# Patient Record
Sex: MALE | Race: BLACK OR AFRICAN AMERICAN | NOT HISPANIC OR LATINO | Employment: OTHER | URBAN - NONMETROPOLITAN AREA
[De-identification: names, ages, dates, MRNs, and addresses within clinical notes are randomized per-mention and may not be internally consistent; named-entity substitution may affect disease eponyms.]

---

## 2022-05-12 ENCOUNTER — HOSPITAL ENCOUNTER (EMERGENCY)
Facility: HOSPITAL | Age: 77
Discharge: HOME | End: 2022-05-12
Payer: MEDICARE

## 2022-05-12 VITALS
HEIGHT: 74 IN | RESPIRATION RATE: 16 BRPM | TEMPERATURE: 98 F | OXYGEN SATURATION: 96 % | WEIGHT: 300 LBS | DIASTOLIC BLOOD PRESSURE: 83 MMHG | HEART RATE: 65 BPM | SYSTOLIC BLOOD PRESSURE: 144 MMHG | BODY MASS INDEX: 38.5 KG/M2

## 2022-05-12 DIAGNOSIS — M25.512 LEFT SHOULDER PAIN, UNSPECIFIED CHRONICITY: Primary | ICD-10-CM

## 2022-05-12 PROCEDURE — 99283 EMERGENCY DEPT VISIT LOW MDM: CPT

## 2022-05-12 PROCEDURE — 99282 EMERGENCY DEPT VISIT SF MDM: CPT | Mod: 25 | Performed by: EMERGENCY MEDICINE

## 2022-05-12 NOTE — ED TRIAGE NOTES
Pt presents to ED with L shoulder pain x 2 hours. Pt states that he was doing PT arm exercises and shoulder popped out of place.

## 2022-05-12 NOTE — ED PROVIDER NOTES
Encounter Date: 5/12/2022       History     Chief Complaint   Patient presents with    Shoulder Pain     C/o Left shoulder pain while Abducting LUE this AM. He has pain with movement of LUE.         Review of patient's allergies indicates:  No Known Allergies  Past Medical History:   Diagnosis Date    Hypertension      History reviewed. No pertinent surgical history.  History reviewed. No pertinent family history.  Social History     Tobacco Use    Smoking status: Never Smoker    Smokeless tobacco: Never Used   Substance Use Topics    Alcohol use: Never    Drug use: Never     Review of Systems   All other systems reviewed and are negative.      Physical Exam     Initial Vitals [05/12/22 1417]   BP Pulse Resp Temp SpO2   (!) 144/83 65 16 98.2 °F (36.8 °C) 96 %      MAP       --         Physical Exam    Constitutional: He appears well-developed and well-nourished.   HENT:   Head: Normocephalic.   Eyes: Pupils are equal, round, and reactive to light.   Neck:   Normal range of motion.  Abdominal:   obese   Musculoskeletal:      Cervical back: Normal range of motion.      Comments: Tender left external shoulder. No step down. M/S intact           Medical Screening Exam   See Full Note    ED Course   Procedures  Labs Reviewed - No data to display       Imaging Results          X-Ray Shoulder 2 or More Views Left (Final result)  Result time 05/12/22 14:45:31    Final result by Maximo Lin II, MD (05/12/22 14:45:31)                 Impression:      Shoulder osteoarthrosis as described above.      Electronically signed by: Maximo Lin  Date:    05/12/2022  Time:    14:45             Narrative:    EXAMINATION:  XR SHOULDER COMPLETE 2 OR MORE VIEWS LEFT    CLINICAL HISTORY:  describes left shoulder pain while abducting BUE this AM;    COMPARISON:  None available    FINDINGS:  No evidence of fracture seen.  The alignment of the joints appears normal.  Moderate acromioclavicular joint and mild-to-moderate  glenohumeral joint degenerative change is present.  No soft tissue abnormality is seen.                              X-Rays:   Independently Interpreted Readings:   Other Readings:  Negative    Medications - No data to display                    Clinical Impression:   Final diagnoses:  [M25.512] Left shoulder pain, unspecified chronicity (Primary)          ED Disposition Condition    Discharge Stable        ED Prescriptions     None        Follow-up Information    None          Real Jefferson MD  05/12/22 1547

## 2022-05-15 ENCOUNTER — TELEPHONE (OUTPATIENT)
Dept: EMERGENCY MEDICINE | Facility: HOSPITAL | Age: 77
End: 2022-05-15
Payer: MEDICARE

## 2022-05-16 ENCOUNTER — TELEPHONE (OUTPATIENT)
Dept: EMERGENCY MEDICINE | Facility: HOSPITAL | Age: 77
End: 2022-05-16
Payer: MEDICARE

## 2024-07-02 DIAGNOSIS — M17.9 OSTEOARTHRITIS OF KNEE, UNSPECIFIED: Primary | ICD-10-CM

## 2024-09-04 DIAGNOSIS — M25.562 LEFT KNEE PAIN, UNSPECIFIED CHRONICITY: Primary | ICD-10-CM

## 2024-09-05 ENCOUNTER — OFFICE VISIT (OUTPATIENT)
Dept: ORTHOPEDICS | Facility: CLINIC | Age: 79
End: 2024-09-05
Payer: MEDICARE

## 2024-09-05 ENCOUNTER — HOSPITAL ENCOUNTER (OUTPATIENT)
Dept: RADIOLOGY | Facility: HOSPITAL | Age: 79
Discharge: HOME | End: 2024-09-05
Payer: MEDICARE

## 2024-09-05 VITALS — HEIGHT: 75 IN | WEIGHT: 300 LBS | BODY MASS INDEX: 37.3 KG/M2

## 2024-09-05 DIAGNOSIS — M17.9 OSTEOARTHRITIS OF KNEE, UNSPECIFIED: ICD-10-CM

## 2024-09-05 DIAGNOSIS — M17.12 PRIMARY OSTEOARTHRITIS OF LEFT KNEE: Primary | ICD-10-CM

## 2024-09-05 DIAGNOSIS — M25.562 LEFT KNEE PAIN, UNSPECIFIED CHRONICITY: ICD-10-CM

## 2024-09-05 PROCEDURE — 99999 PR PBB SHADOW E&M-EST. PATIENT-LVL III: CPT | Mod: PBBFAC,,,

## 2024-09-05 PROCEDURE — 1159F MED LIST DOCD IN RCRD: CPT | Mod: CPTII,,,

## 2024-09-05 PROCEDURE — 99213 OFFICE O/P EST LOW 20 MIN: CPT | Mod: PBBFAC,25

## 2024-09-05 PROCEDURE — 99203 OFFICE O/P NEW LOW 30 MIN: CPT | Mod: S$PBB,,,

## 2024-09-05 PROCEDURE — 73564 X-RAY EXAM KNEE 4 OR MORE: CPT | Mod: 26,LT,, | Performed by: RADIOLOGY

## 2024-09-05 PROCEDURE — 73564 X-RAY EXAM KNEE 4 OR MORE: CPT | Mod: TC,LT

## 2024-09-05 RX ORDER — EZETIMIBE 10 MG/1
10 TABLET ORAL DAILY
COMMUNITY
Start: 2024-04-19

## 2024-09-05 RX ORDER — ALLOPURINOL 100 MG/1
200 TABLET ORAL DAILY
COMMUNITY
Start: 2024-04-19

## 2024-09-05 RX ORDER — ASPIRIN 81 MG/1
81 TABLET ORAL DAILY
COMMUNITY
Start: 2024-04-19

## 2024-09-05 RX ORDER — RISPERIDONE 37.5MG/2ML
37.5 KIT INTRAMUSCULAR
COMMUNITY
Start: 2024-04-05

## 2024-09-05 RX ORDER — CARVEDILOL 25 MG/1
37.5 TABLET ORAL
COMMUNITY
Start: 2024-04-19

## 2024-09-05 RX ORDER — FUROSEMIDE 40 MG/1
40 TABLET ORAL DAILY
COMMUNITY
Start: 2024-04-19

## 2024-09-05 NOTE — PROGRESS NOTES
CC:   Chief Complaint   Patient presents with    Left Knee - Pain          Jc Navas is a 78 y.o. 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  He has previously had steroid injections of the left knee greater than 10 years ago.  He reports he is currently taking Voltaren gel for left knee pain without much relief.  He reports a dull aching pain with weight-bearing.  He denies any recent fall or injury.  No other complaints today.        PAST MEDICAL HISTORY:   Past Medical History:   Diagnosis Date    Hypertension           PAST SURGICAL HISTORY: History reviewed. No pertinent surgical history.       ALLERGIES: Review of patient's allergies indicates:  No Known Allergies     MEDICATIONS :    Current Outpatient Medications:     allopurinoL (ZYLOPRIM) 100 MG tablet, Take 200 mg by mouth once daily., Disp: , Rfl:     aspirin (ECOTRIN) 81 MG EC tablet, Take 81 mg by mouth once daily., Disp: , Rfl:     carvediloL (COREG) 25 MG tablet, Take 37.5 mg by mouth., Disp: , Rfl:     ezetimibe (ZETIA) 10 mg tablet, Take 10 mg by mouth once daily., Disp: , Rfl:     furosemide (LASIX) 40 MG tablet, Take 40 mg by mouth once daily., Disp: , Rfl:     risperiDONE microspheres (RISPERDAL CONSTA) 37.5 mg/2 mL injection, Inject 37.5 mg into the muscle every 14 (fourteen) days., Disp: , Rfl:     sacubitriL-valsartan (ENTRESTO) 49-51 mg per tablet, Take 1 tablet by mouth 2 (two) times daily., Disp: , Rfl:      SOCIAL HISTORY:   Social History     Socioeconomic History    Marital status:    Tobacco Use    Smoking status: Never    Smokeless tobacco: Never   Substance and Sexual Activity    Alcohol use: Never    Drug use: Never    Sexual activity: Not Currently        FAMILY HISTORY: No family history on file.       PHYSICAL EXAM:      There were no vitals filed for this visit.  Body mass index is 37.5 kg/m².    GENERAL:  Well-developed, well-nourished male . The patient is alert, oriented and cooperative.    HEENT:  Normocephalic, atraumatic.  Extraocular movements are intact bilaterally.     NECK:  Nontender with good range of motion.    LUNGS:  Clear to auscultation bilaterally.    HEART:  Regular rate and rhythm.     ABDOMEN:  Soft, non-tender, non-distended.      EXTREMITIES:  Left knee with skin clean dry and intact, tenderness to palpation along medial and lateral joint lines, crepitus noted over patella with movement, range of motion from 0-100 degrees but not without pain, knee feels stable to varus and valgus stress testing, neurovascularly intact      RADIOGRAPHIC FINDINGS:   No results found.     There are no problems to display for this patient.    IMPRESSION AND PLAN:  Primary osteoarthritis left knee.  Personally reviewed x-rays today showing moderate to severe tricompartmental degenerative changes with osteophyte formation and joint space loss, no acute fracture or dislocation.  Discussed all treatment options with the patient today.  Discussed that he would likely need a total knee arthroplasty in the near future.  Patient reports that he would not like to pursue surgical options at this time.  Discussed injection options to include steroid or viscous gel injections.  Patient reports minimal relief from previous injections greater than 10 years ago.  He would like to try the gel injections.  Prior authorization order placed today.  For Synvisc-One.  Follow up with me in 3 weeks for injection.  Otherwise p.r.n..    No follow-ups on file.       Aaliyah Johnson PA-C      (Subject to voice recognition error, transcription service not allowed)

## 2024-12-18 DIAGNOSIS — M25.562 LEFT KNEE PAIN: Primary | ICD-10-CM

## 2025-01-16 ENCOUNTER — OFFICE VISIT (OUTPATIENT)
Dept: ORTHOPEDICS | Facility: CLINIC | Age: 80
End: 2025-01-16
Payer: MEDICARE

## 2025-01-16 DIAGNOSIS — M25.562 LEFT KNEE PAIN: ICD-10-CM

## 2025-01-16 PROCEDURE — 99999 PR PBB SHADOW E&M-EST. PATIENT-LVL II: CPT | Mod: PBBFAC,,,

## 2025-01-16 PROCEDURE — 99213 OFFICE O/P EST LOW 20 MIN: CPT | Mod: S$PBB,,,

## 2025-01-16 PROCEDURE — 99212 OFFICE O/P EST SF 10 MIN: CPT | Mod: PBBFAC

## 2025-02-05 NOTE — PROGRESS NOTES
CC: No chief complaint on file.       Jc Navas is a 79 y.o. male seen today for follow up of No chief complaint on file.  Patient presents today for left knee pain.  He is hoping for gel injections however he is a no-show for his appointment in September for gel injections.  Discussed that new order would have to be placed today. Reports continued left knee pain. No new fall/injury.       PAST MEDICAL HISTORY:   Past Medical History:   Diagnosis Date    Hypertension         PAST SURGICAL HISTORY: No past surgical history on file.     ALLERGIES: Review of patient's allergies indicates:  No Known Allergies     MEDICATIONS :    Current Outpatient Medications:     allopurinoL (ZYLOPRIM) 100 MG tablet, Take 200 mg by mouth once daily., Disp: , Rfl:     aspirin (ECOTRIN) 81 MG EC tablet, Take 81 mg by mouth once daily., Disp: , Rfl:     carvediloL (COREG) 25 MG tablet, Take 37.5 mg by mouth., Disp: , Rfl:     ezetimibe (ZETIA) 10 mg tablet, Take 10 mg by mouth once daily., Disp: , Rfl:     furosemide (LASIX) 40 MG tablet, Take 40 mg by mouth once daily., Disp: , Rfl:     risperiDONE microspheres (RISPERDAL CONSTA) 37.5 mg/2 mL injection, Inject 37.5 mg into the muscle every 14 (fourteen) days., Disp: , Rfl:     sacubitriL-valsartan (ENTRESTO) 49-51 mg per tablet, Take 1 tablet by mouth 2 (two) times daily., Disp: , Rfl:      SOCIAL HISTORY:   Social History     Socioeconomic History    Marital status:    Tobacco Use    Smoking status: Never    Smokeless tobacco: Never   Substance and Sexual Activity    Alcohol use: Never    Drug use: Never    Sexual activity: Not Currently        FAMILY HISTORY: No family history on file.       PHYSICAL EXAM:      There were no vitals filed for this visit.  There is no height or weight on file to calculate BMI.    GENERAL: Well-developed, well-nourished male . The patient is alert, oriented and cooperative.    EXTREMITIES:  Left knee tenderness to palpation  along medial and lateral joint lines, range of motion from 0-90 degrees, neurovascularly intact      RADIOGRAPHIC FINDINGS:   No results found.     There are no active problems to display for this patient.      IMPRESSION AND PLAN:  Primary osteoarthritis left knee.  Discussed with the patient that previous prior authorization for gel injection has a  as that was September of last year. New PA would have to be placed today. Discussed follow up in a few weeks for gel injection if approved.       No follow-ups on file.       Aaliyah Johnson PA-C      (Subject to voice recognition error, transcription service not allowed)

## 2025-07-25 DIAGNOSIS — F98.1 ENCOPRESIS NOT DUE TO A SUBSTANCE OR KNOWN PHYSIOLOGICAL CONDITION: ICD-10-CM

## 2025-07-25 DIAGNOSIS — R15.9 BOWEL INCONTINENCE: Primary | ICD-10-CM

## 2025-08-21 ENCOUNTER — HOSPITAL ENCOUNTER (OUTPATIENT)
Dept: RADIOLOGY | Facility: HOSPITAL | Age: 80
Discharge: HOME | End: 2025-08-21
Attending: INTERNAL MEDICINE
Payer: OTHER GOVERNMENT

## 2025-08-21 DIAGNOSIS — R15.9 FECAL INCONTINENCE: ICD-10-CM

## 2025-08-21 PROCEDURE — 74019 RADEX ABDOMEN 2 VIEWS: CPT | Mod: 26,,, | Performed by: RADIOLOGY

## 2025-08-21 PROCEDURE — 74019 RADEX ABDOMEN 2 VIEWS: CPT | Mod: TC,FY
